# Patient Record
Sex: FEMALE | Race: WHITE | NOT HISPANIC OR LATINO | URBAN - METROPOLITAN AREA
[De-identification: names, ages, dates, MRNs, and addresses within clinical notes are randomized per-mention and may not be internally consistent; named-entity substitution may affect disease eponyms.]

---

## 2023-03-20 ENCOUNTER — TELEPHONE (OUTPATIENT)
Dept: PRIMARY CARE | Facility: CLINIC | Age: 53
End: 2023-03-20
Payer: COMMERCIAL

## 2023-03-20 NOTE — TELEPHONE ENCOUNTER
Please inform the patient that her Cologuard test was negative.  This must be repeated every 3 years.

## 2023-03-20 NOTE — TELEPHONE ENCOUNTER
Patient informed of results. She verbalized understanding and all questions and concerns answered at this time.

## 2023-03-31 ENCOUNTER — TELEPHONE (OUTPATIENT)
Dept: PRIMARY CARE | Facility: CLINIC | Age: 53
End: 2023-03-31
Payer: COMMERCIAL

## 2023-03-31 LAB
CHOLESTEROL (MG/DL) IN SER/PLAS: 210 MG/DL (ref 0–199)
CHOLESTEROL IN HDL (MG/DL) IN SER/PLAS: 57.5 MG/DL
CHOLESTEROL/HDL RATIO: 3.7
LDL: 129 MG/DL (ref 0–99)
THYROTROPIN (MIU/L) IN SER/PLAS BY DETECTION LIMIT <= 0.05 MIU/L: 3.56 MIU/L (ref 0.44–3.98)
TRIGLYCERIDE (MG/DL) IN SER/PLAS: 118 MG/DL (ref 0–149)
VLDL: 24 MG/DL (ref 0–40)

## 2023-03-31 NOTE — TELEPHONE ENCOUNTER
----- Message from Levon Green MD sent at 3/31/2023  1:17 PM EDT -----  Please inform the patient that her cholesterol is slightly elevated again at 210.  Her other labs are normal.  We will discuss at her next appt.

## 2023-04-07 DIAGNOSIS — E05.90 HYPERTHYROIDISM: ICD-10-CM

## 2023-04-07 RX ORDER — LEVOTHYROXINE SODIUM 88 UG/1
88 TABLET ORAL DAILY
Qty: 90 TABLET | Refills: 1 | Status: SHIPPED | OUTPATIENT
Start: 2023-04-07 | End: 2023-12-07 | Stop reason: SDUPTHER

## 2023-04-07 RX ORDER — LEVOTHYROXINE SODIUM 88 UG/1
88 TABLET ORAL DAILY
COMMUNITY
End: 2023-04-07 | Stop reason: SDUPTHER

## 2023-08-01 ENCOUNTER — OFFICE VISIT (OUTPATIENT)
Dept: PRIMARY CARE | Facility: CLINIC | Age: 53
End: 2023-08-01
Payer: COMMERCIAL

## 2023-08-01 VITALS
HEART RATE: 71 BPM | TEMPERATURE: 98 F | HEIGHT: 63 IN | WEIGHT: 141 LBS | RESPIRATION RATE: 16 BRPM | BODY MASS INDEX: 24.98 KG/M2 | DIASTOLIC BLOOD PRESSURE: 80 MMHG | SYSTOLIC BLOOD PRESSURE: 118 MMHG

## 2023-08-01 DIAGNOSIS — Z00.00 ENCOUNTER FOR PREVENTIVE HEALTH EXAMINATION: Primary | ICD-10-CM

## 2023-08-01 PROBLEM — K20.90 ACUTE ESOPHAGITIS: Status: ACTIVE | Noted: 2023-08-01

## 2023-08-01 PROBLEM — J30.2 SEASONAL ALLERGIC RHINITIS: Status: ACTIVE | Noted: 2023-08-01

## 2023-08-01 PROBLEM — E03.9 HYPOTHYROIDISM: Status: ACTIVE | Noted: 2023-08-01

## 2023-08-01 PROBLEM — E78.5 HLD (HYPERLIPIDEMIA): Status: ACTIVE | Noted: 2023-08-01

## 2023-08-01 PROBLEM — E55.9 VITAMIN D DEFICIENCY: Status: ACTIVE | Noted: 2023-08-01

## 2023-08-01 PROBLEM — S51.812S: Status: ACTIVE | Noted: 2023-08-01

## 2023-08-01 PROBLEM — J31.2 PHARYNGITIS, CHRONIC: Status: ACTIVE | Noted: 2023-08-01

## 2023-08-01 PROCEDURE — 1036F TOBACCO NON-USER: CPT | Performed by: FAMILY MEDICINE

## 2023-08-01 PROCEDURE — 99396 PREV VISIT EST AGE 40-64: CPT | Performed by: FAMILY MEDICINE

## 2023-08-01 NOTE — PROGRESS NOTES
Sylwia Moreno is a 53 y.o. female here today a periodic health exam.  I reviewed previous preventative health measures including screening tests, immunizations and labs.      HPI   CURRENT COMPLAINTS OR CONCERNS: None.  She is feeling well and has no health concerns or problems at this time.        PREVIOUS PREVENTATIVE HEALTH  Colonoscopy : NO  Cologuard : YES -- DATE 3/27/23  Mammogram : YES -- DATE 3/2023 - Dr. Carter  Pap Test :  YES -- DATE Yearly  PSA : N/A  Hepatitis C Antibody : NO  HIV Screening : NO  Prevnar : N/A  Tdap : YES -- DATE 8/20/21  Shingrix : YES -- DATE 2022 x 2  Yearly Flu Shot : YES    CURRENT FINDINGS  Healthy Diet : YES  Exercise :  YES --  regular  Depression Issues : NO  Alcohol Use : YES --  very rare  Tobacco Use : NO        Current Outpatient Medications:     levothyroxine (Synthroid, Levoxyl) 88 mcg tablet, Take 1 tablet (88 mcg) by mouth once daily. as directed, Disp: 90 tablet, Rfl: 1    Past Medical History:   Diagnosis Date    Acute upper respiratory infection, unspecified 12/19/2019    Acute upper respiratory infection    Otalgia, left ear 12/19/2019    Left ear pain       History reviewed. No pertinent surgical history.    No family history on file.    Social History     Tobacco Use    Smoking status: Never    Smokeless tobacco: Never   Vaping Use    Vaping Use: Never used   Substance Use Topics    Alcohol use: Yes    Drug use: Never       Immunization History   Administered Date(s) Administered    Pfizer Purple Cap SARS-CoV-2 02/24/2021, 03/17/2021, 10/24/2021    Tdap vaccine, age 10 years and older (BOOSTRIX) 05/20/2008, 08/20/2021    Zoster vaccine, recombinant, adult (SHINGRIX) 03/12/2022, 05/31/2022         Objective    Visit Vitals  /80   Pulse 71   Temp 36.7 °C (98 °F)   Resp 16       Physical Exam  Vitals and nursing note reviewed.   Constitutional:       General: She is not in acute distress.     Appearance: Normal appearance.   HENT:      Head: Normocephalic  and atraumatic.      Right Ear: Tympanic membrane, ear canal and external ear normal.      Left Ear: Tympanic membrane, ear canal and external ear normal.      Nose: Nose normal.      Mouth/Throat:      Mouth: Mucous membranes are moist.      Pharynx: Oropharynx is clear.   Eyes:      Extraocular Movements: Extraocular movements intact.      Conjunctiva/sclera: Conjunctivae normal.      Pupils: Pupils are equal, round, and reactive to light.   Cardiovascular:      Rate and Rhythm: Normal rate and regular rhythm.      Pulses: Normal pulses.      Heart sounds: Normal heart sounds. No murmur heard.     No friction rub. No gallop.   Pulmonary:      Effort: Pulmonary effort is normal. No respiratory distress.      Breath sounds: Normal breath sounds.   Abdominal:      General: Abdomen is flat. Bowel sounds are normal. There is no distension.      Palpations: Abdomen is soft.      Tenderness: There is no abdominal tenderness.   Musculoskeletal:         General: Normal range of motion.      Cervical back: Normal range of motion and neck supple.   Lymphadenopathy:      Cervical: No cervical adenopathy.   Skin:     General: Skin is warm and dry.      Findings: No lesion or rash.   Neurological:      General: No focal deficit present.      Mental Status: She is alert. Mental status is at baseline.   Psychiatric:         Mood and Affect: Mood normal.         Behavior: Behavior normal.         Thought Content: Thought content normal.         Judgment: Judgment normal.        Assessment    1. Encounter for preventive health examination  Hepatitis C Antibody, HIV 1/2 Antigen/Antibody Screen with Reflex to Confirmation   Recommend regular exercise, balanced diet, regular dental exams, and healthy habits.  Appropriate labs ordered or reviewed.  She will see her gynecologist for routine female care.  Colon cancer screening is up-to-date.  Immunizations are up-to-date.  I recommend to eat plenty of plant foods (such as whole-grain  products, fruits, and vegetables) and a moderate amount of lean and low-fat, animal-based food (meat and dairy products).  When shopping, choose lean meats, fish, and poultry. I recommend to increase aerobic exercise.  I recommend a yearly flu shot in the fall and I recommend a yearly wellness exam.

## 2023-12-07 ENCOUNTER — TELEPHONE (OUTPATIENT)
Dept: PRIMARY CARE | Facility: CLINIC | Age: 53
End: 2023-12-07
Payer: COMMERCIAL

## 2023-12-07 DIAGNOSIS — E05.90 HYPERTHYROIDISM: ICD-10-CM

## 2023-12-07 NOTE — TELEPHONE ENCOUNTER
1) Pt req refill of Synthroid. She has 1 pill left. Send to local pharm: correct in computer. Next office visit 2/01/24.  2) Does pt need to do lab work? It looks like there is an order for labs in computer. What is being tested? Does pt need lab work to check thyroid?

## 2023-12-08 DIAGNOSIS — E03.9 ACQUIRED HYPOTHYROIDISM: Primary | ICD-10-CM

## 2023-12-08 RX ORDER — LEVOTHYROXINE SODIUM 88 UG/1
88 TABLET ORAL DAILY
Qty: 90 TABLET | Refills: 0 | Status: SHIPPED | OUTPATIENT
Start: 2023-12-08 | End: 2024-02-01 | Stop reason: SDUPTHER

## 2024-02-01 ENCOUNTER — OFFICE VISIT (OUTPATIENT)
Dept: PRIMARY CARE | Facility: CLINIC | Age: 54
End: 2024-02-01
Payer: COMMERCIAL

## 2024-02-01 VITALS
WEIGHT: 142 LBS | RESPIRATION RATE: 16 BRPM | HEART RATE: 71 BPM | DIASTOLIC BLOOD PRESSURE: 70 MMHG | TEMPERATURE: 98.1 F | HEIGHT: 63 IN | SYSTOLIC BLOOD PRESSURE: 110 MMHG | BODY MASS INDEX: 25.16 KG/M2

## 2024-02-01 DIAGNOSIS — E55.9 VITAMIN D DEFICIENCY: ICD-10-CM

## 2024-02-01 DIAGNOSIS — E78.2 MIXED HYPERLIPIDEMIA: ICD-10-CM

## 2024-02-01 DIAGNOSIS — E03.9 ACQUIRED HYPOTHYROIDISM: Primary | ICD-10-CM

## 2024-02-01 PROBLEM — N81.4 UTERINE PROLAPSE: Status: RESOLVED | Noted: 2017-08-08 | Resolved: 2024-02-01

## 2024-02-01 PROBLEM — J31.2 PHARYNGITIS, CHRONIC: Status: RESOLVED | Noted: 2023-08-01 | Resolved: 2024-02-01

## 2024-02-01 PROBLEM — S51.812S: Status: RESOLVED | Noted: 2023-08-01 | Resolved: 2024-02-01

## 2024-02-01 PROBLEM — N81.4 UTERINE PROLAPSE: Status: ACTIVE | Noted: 2017-08-08

## 2024-02-01 PROBLEM — N95.0 PMB (POSTMENOPAUSAL BLEEDING): Status: ACTIVE | Noted: 2024-01-31

## 2024-02-01 PROBLEM — L72.0 INCLUSION CYST: Status: ACTIVE | Noted: 2018-08-03

## 2024-02-01 PROCEDURE — 1036F TOBACCO NON-USER: CPT | Performed by: FAMILY MEDICINE

## 2024-02-01 PROCEDURE — 99214 OFFICE O/P EST MOD 30 MIN: CPT | Performed by: FAMILY MEDICINE

## 2024-02-01 RX ORDER — LEVOTHYROXINE SODIUM 88 UG/1
88 TABLET ORAL DAILY
Qty: 90 TABLET | Refills: 1 | Status: SHIPPED | OUTPATIENT
Start: 2024-02-01

## 2024-02-01 NOTE — PROGRESS NOTES
"Sylwia Moreno is a 53 y.o. female here today for  thyroid recheck.       HPI   Hypothyroidism -- patient reports no problems with dry skin, constipation, cold intolerance, unexplained weight loss, fatigue or heart palpitations. Patient is taking medications correctly with no side effects or problems.      She has a history of vitamin D deficiency but she is not currently taking vitamin D.    She has a history of high cholesterol but she has never been on a cholesterol medicine.  She does eat a healthy diet and exercises very regularly.      Current Outpatient Medications:     levothyroxine (Synthroid, Levoxyl) 88 mcg tablet, Take 1 tablet (88 mcg) by mouth once daily. as directed, Disp: 90 tablet, Rfl: 1    Patient Active Problem List   Diagnosis    Acute esophagitis    Mixed hyperlipidemia    Acquired hypothyroidism    Seasonal allergic rhinitis    Vitamin D deficiency    Encounter for preventive health examination    Inclusion cyst    PMB (postmenopausal bleeding)         No results found for this or any previous visit (from the past 672 hour(s)).     Objective    Visit Vitals    Visit Vitals  /70   Pulse 71   Temp 36.7 °C (98.1 °F)   Resp 16   Ht 1.6 m (5' 3\")   Wt 64.4 kg (142 lb)   BMI 25.15 kg/m²   Smoking Status Never   BSA 1.69 m²       Body mass index is 25.15 kg/m².     Physical Exam   General - Not in acute distress and cooperative.  Build & Nutrition - Well developed  Posture - Normal  Gait - Normal  Mental Status - alert and oriented x 3    Head - Normocephalic    Neck - Thyroid normal size    Eyes - Bilateral - Sclera clear and lids pink without edema or mass.      Skin - Warm and dry with no rashes on visible skin    Lungs - Clear to auscultation and normal breathing effort    Cardiovascular - RRR and no murmurs, rubs or thrill.    Peripheral Vascular - Bilateral - no edema present    Neuropsychiatric - normal mood and affect        Assessment    1. Acquired hypothyroidism  levothyroxine " (Synthroid, Levoxyl) 88 mcg tablet, Thyroid Stimulating Hormone   Condition well controlled.  No change in current treatment regimen.  Refill given of current medication.  Appropriate labs ordered or reviewed.  Make a follow up appointment with me for recheck in 6 months.       2. Mixed hyperlipidemia  Lipid Panel   Appropriate labs ordered or reviewed.     3. Vitamin D deficiency  Vitamin D 25-Hydroxy,Total (for eval of Vitamin D levels)   Appropriate labs ordered or reviewed.

## 2024-07-16 ENCOUNTER — LAB (OUTPATIENT)
Dept: LAB | Facility: LAB | Age: 54
End: 2024-07-16
Payer: COMMERCIAL

## 2024-07-16 DIAGNOSIS — E03.9 ACQUIRED HYPOTHYROIDISM: ICD-10-CM

## 2024-07-16 DIAGNOSIS — E78.2 MIXED HYPERLIPIDEMIA: ICD-10-CM

## 2024-07-16 DIAGNOSIS — E55.9 VITAMIN D DEFICIENCY: ICD-10-CM

## 2024-07-16 DIAGNOSIS — Z00.00 ENCOUNTER FOR PREVENTIVE HEALTH EXAMINATION: ICD-10-CM

## 2024-07-16 LAB
25(OH)D3 SERPL-MCNC: 34 NG/ML (ref 30–100)
CHOLEST SERPL-MCNC: 214 MG/DL (ref 0–199)
CHOLESTEROL/HDL RATIO: 4.1
HCV AB SER QL: NONREACTIVE
HDLC SERPL-MCNC: 52.4 MG/DL
HIV 1+2 AB+HIV1 P24 AG SERPL QL IA: NONREACTIVE
LDLC SERPL CALC-MCNC: 139 MG/DL
NON HDL CHOLESTEROL: 162 MG/DL (ref 0–149)
T4 FREE SERPL-MCNC: 1.4 NG/DL (ref 0.78–1.48)
TRIGL SERPL-MCNC: 115 MG/DL (ref 0–149)
TSH SERPL-ACNC: 0.35 MIU/L (ref 0.44–3.98)
VLDL: 23 MG/DL (ref 0–40)

## 2024-07-16 PROCEDURE — 84439 ASSAY OF FREE THYROXINE: CPT

## 2024-07-16 PROCEDURE — 82306 VITAMIN D 25 HYDROXY: CPT

## 2024-07-16 PROCEDURE — 87389 HIV-1 AG W/HIV-1&-2 AB AG IA: CPT

## 2024-07-16 PROCEDURE — 86803 HEPATITIS C AB TEST: CPT

## 2024-07-16 PROCEDURE — 84443 ASSAY THYROID STIM HORMONE: CPT

## 2024-07-16 PROCEDURE — 36415 COLL VENOUS BLD VENIPUNCTURE: CPT

## 2024-07-16 PROCEDURE — 80061 LIPID PANEL: CPT

## 2024-07-16 RX ORDER — LEVOTHYROXINE SODIUM 75 UG/1
75 TABLET ORAL DAILY
Qty: 30 TABLET | Refills: 3 | Status: SHIPPED | OUTPATIENT
Start: 2024-07-16 | End: 2025-07-16

## 2024-07-16 NOTE — RESULT ENCOUNTER NOTE
Please inform the patient that her recent labs show that her thyroid level is slightly too high.  We need to decrease the dose of levothyroxine.  She is currently taking 88 mcg and I want to decrease to 75 mcg once a day.  Please send in a 3-month prescription and we need to check her TSH in 2 months to make sure that this is the correct dose.  Her cholesterol labs were also slightly elevated as we have discussed in the past but her risk is not high enough to recommend medications at this time.  The rest of her labs were essentially normal.

## 2024-08-01 ENCOUNTER — APPOINTMENT (OUTPATIENT)
Dept: PRIMARY CARE | Facility: CLINIC | Age: 54
End: 2024-08-01
Payer: COMMERCIAL

## 2024-08-01 VITALS
WEIGHT: 142 LBS | TEMPERATURE: 97.9 F | SYSTOLIC BLOOD PRESSURE: 114 MMHG | HEART RATE: 65 BPM | HEIGHT: 63 IN | BODY MASS INDEX: 25.16 KG/M2 | RESPIRATION RATE: 16 BRPM | DIASTOLIC BLOOD PRESSURE: 70 MMHG

## 2024-08-01 DIAGNOSIS — E78.2 MIXED HYPERLIPIDEMIA: Primary | ICD-10-CM

## 2024-08-01 DIAGNOSIS — E03.9 ACQUIRED HYPOTHYROIDISM: ICD-10-CM

## 2024-08-01 PROCEDURE — 1036F TOBACCO NON-USER: CPT | Performed by: FAMILY MEDICINE

## 2024-08-01 PROCEDURE — 3008F BODY MASS INDEX DOCD: CPT | Performed by: FAMILY MEDICINE

## 2024-08-01 PROCEDURE — 99213 OFFICE O/P EST LOW 20 MIN: CPT | Performed by: FAMILY MEDICINE

## 2024-08-01 RX ORDER — LEVOTHYROXINE SODIUM 75 UG/1
75 TABLET ORAL DAILY
Qty: 90 TABLET | Refills: 1 | Status: SHIPPED | OUTPATIENT
Start: 2024-08-01 | End: 2025-08-01

## 2024-08-01 NOTE — PROGRESS NOTES
Sylwia Moreno is a 54 y.o. female here today for   Chief Complaint   Patient presents with    Hypothyroidism        HPI   Hypothyroidism -- patient reports no problems with dry skin, constipation, cold intolerance, unexplained weight loss, fatigue or heart palpitations. Patient is taking medications correctly with no side effects or problems.  Recent labs showed that her thyroid was slightly elevated so we decreased levothyroxine from 88 mcg down to 75 mcg.    HLD recheck --she has never been on medication for this.  Her 10-year ASCVD risk today is 1.7%.  Recent labs show that her total cholesterol was still slightly elevated at 214.        Current Outpatient Medications:     levothyroxine (Synthroid, Levoxyl) 75 mcg tablet, Take 1 tablet (75 mcg) by mouth early in the morning.. Take on an empty stomach at the same time each day, either 30 to 60 minutes prior to breakfast, Disp: 90 tablet, Rfl: 1    Patient Active Problem List   Diagnosis    Acute esophagitis    Mixed hyperlipidemia    Acquired hypothyroidism    Seasonal allergic rhinitis    Vitamin D deficiency    Encounter for preventive health examination    Inclusion cyst    PMB (postmenopausal bleeding)         Recent Results (from the past 672 hour(s))   Hepatitis C Antibody    Collection Time: 07/16/24  7:27 AM   Result Value Ref Range    Hepatitis C AB Nonreactive Nonreactive   HIV 1/2 Antigen/Antibody Screen with Reflex to Confirmation    Collection Time: 07/16/24  7:27 AM   Result Value Ref Range    HIV 1/2 Antigen/Antibody Screen with Reflex to Confirmation Nonreactive Nonreactive   TSH with reflex to Free T4 if abnormal    Collection Time: 07/16/24  7:27 AM   Result Value Ref Range    Thyroid Stimulating Hormone 0.35 (L) 0.44 - 3.98 mIU/L   Lipid Panel    Collection Time: 07/16/24  7:27 AM   Result Value Ref Range    Cholesterol 214 (H) 0 - 199 mg/dL    HDL-Cholesterol 52.4 mg/dL    Cholesterol/HDL Ratio 4.1     LDL Calculated 139 (H) <=99 mg/dL     "VLDL 23 0 - 40 mg/dL    Triglycerides 115 0 - 149 mg/dL    Non HDL Cholesterol 162 (H) 0 - 149 mg/dL   Vitamin D 25-Hydroxy,Total (for eval of Vitamin D levels)    Collection Time: 07/16/24  7:27 AM   Result Value Ref Range    Vitamin D, 25-Hydroxy, Total 34 30 - 100 ng/mL   Thyroxine, Free    Collection Time: 07/16/24  7:27 AM   Result Value Ref Range    Thyroxine, Free 1.40 0.78 - 1.48 ng/dL        Objective    Visit Vitals    Visit Vitals  /70   Pulse 65   Temp 36.6 °C (97.9 °F)   Resp 16   Ht 1.6 m (5' 3\")   Wt 64.4 kg (142 lb)   BMI 25.15 kg/m²   Smoking Status Never   BSA 1.69 m²       Body mass index is 25.15 kg/m².     Physical Exam   General - Not in acute distress and cooperative.  Build & Nutrition - Well developed  Posture - Normal  Gait - Normal  Mental Status - alert and oriented x 3    Head - Normocephalic    Neck - Thyroid normal size    Eyes - Bilateral - Sclera clear and lids pink without edema or mass.      Skin - Warm and dry with no rashes on visible skin    Lungs - Clear to auscultation and normal breathing effort    Cardiovascular - RRR and no murmurs, rubs or thrill.    Peripheral Vascular - Bilateral - no edema present    Neuropsychiatric - normal mood and affect        Assessment    1. Mixed hyperlipidemia     She has never been on medication for this and her 10-year risk is low so medications are not indicated at this point.  She already eats a healthy diet and exercises regularly.  We discussed ways to lower her cholesterol further with dietary changes.  We will monitor over time.     2. Acquired hypothyroidism  levothyroxine (Synthroid, Levoxyl) 75 mcg tablet   We recently decreased her levothyroxine dose to 75 mcg and we will get a repeat TSH in about 2 to 3 months and call her with results.         Orders Placed This Encounter      levothyroxine (Synthroid, Levoxyl) 75 mcg tablet       No orders of the defined types were placed in this encounter.       New Medications Ordered " This Visit   Medications    levothyroxine (Synthroid, Levoxyl) 75 mcg tablet     Sig: Take 1 tablet (75 mcg) by mouth early in the morning.. Take on an empty stomach at the same time each day, either 30 to 60 minutes prior to breakfast     Dispense:  90 tablet     Refill:  1

## 2024-08-02 ENCOUNTER — OFFICE VISIT (OUTPATIENT)
Dept: PODIATRY | Facility: CLINIC | Age: 54
End: 2024-08-02
Payer: COMMERCIAL

## 2024-08-02 DIAGNOSIS — M72.2 PLANTAR FASCIITIS: Primary | ICD-10-CM

## 2024-08-02 PROCEDURE — 99203 OFFICE O/P NEW LOW 30 MIN: CPT | Performed by: PODIATRIST

## 2024-08-02 RX ORDER — MELOXICAM 15 MG/1
15 TABLET ORAL DAILY
Qty: 30 TABLET | Refills: 11 | Status: SHIPPED | OUTPATIENT
Start: 2024-08-02 | End: 2025-08-02

## 2024-08-02 NOTE — PROGRESS NOTES
History Of Present Illness  Sylwia Moreno is a 54 y.o. female presenting with chief complaint of: transition of care for right foot pain   Patient has intense pain for steps in the morning and after periods of rest.  The pain is primarily in the plantar lateral right heel.  It can radiate up her leg and down the lateral aspect of her foot.  PCP Levon Green  Last visit 8/1/24     Past Medical History  She has a past medical history of Acute upper respiratory infection, unspecified (12/19/2019), Forearm laceration, left, sequela (08/01/2023), Otalgia, left ear (12/19/2019), Pharyngitis, chronic (08/01/2023), and Uterine prolapse (08/08/2017).    Surgical History  She has no past surgical history on file.     Social History  She reports that she has never smoked. She has never used smokeless tobacco. She reports current alcohol use. She reports that she does not use drugs.    Family History  No family history on file.     Allergies  Patient has no known allergies.    Medications  Current Outpatient Medications   Medication Sig Dispense Refill    levothyroxine (Synthroid, Levoxyl) 75 mcg tablet Take 1 tablet (75 mcg) by mouth early in the morning.. Take on an empty stomach at the same time each day, either 30 to 60 minutes prior to breakfast 90 tablet 1     No current facility-administered medications for this visit.       Review of Systems    REVIEW OF SYSTEMS  GENERAL:  Negative for malaise, significant weight loss, fever  CARDIOVASCULAR: leg swelling   MUSCULOSKELETAL:  Negative for joint pain or swelling, back pain, and muscle pain.  SKIN:  Negative for lesions, rash, and itching  PSYCH:  Negative for sleep disturbance, mood disorder and recent psychosocial stressors  NEURO: Negative, denies any burning, tingling or numbness     Objective:   Vasc: DP and PT pulses are palpable bilateral.  CFT is less than 3 seconds bilateral.  Skin temperature is warm to cool proximal to distal bilateral.      Neuro:  Light  touch is intact to the foot bilateral. There is no clonus noted.  The hallux is downgoing bilateral.      Derm: Nails are normal. Skin is supple with normal texture and turgor noted.  Webspaces are clean, dry and intact bilateral.  There are no hyperkeratoses, ulcerations, verruca or other lesions noted.      Ortho: Muscle strength is 5/5 for all pedal groups tested.  Ankle joint, subtalar joint, 1st MPJ and lesser MPJ ROM is full and without pain or crepitus.  The foot type is rectus bilateral off weight bearing.  Mild pes planus.  Pain is present with palpation of the insertion site of the lateral band of the plantar fascia.  Assessment/Plan     Diagnoses and all orders for this visit:  Plantar fasciitis      We discussed etiology of plantar fasciitis as well as treatments.  Patient will try meloxicam daily for the next 20 to 30 days.  She gear modification has been advised.  Patient can also try night splint.  Patient is given a prescription for physical therapy.           Swapna Mederos CMA

## 2025-02-04 ENCOUNTER — APPOINTMENT (OUTPATIENT)
Dept: PRIMARY CARE | Facility: CLINIC | Age: 55
End: 2025-02-04
Payer: COMMERCIAL

## 2025-03-07 DIAGNOSIS — E03.9 ACQUIRED HYPOTHYROIDISM: ICD-10-CM

## 2025-03-07 RX ORDER — LEVOTHYROXINE SODIUM 75 UG/1
75 TABLET ORAL DAILY
Qty: 30 TABLET | Refills: 0 | Status: SHIPPED | OUTPATIENT
Start: 2025-03-07 | End: 2026-03-07

## 2025-03-17 ENCOUNTER — APPOINTMENT (OUTPATIENT)
Dept: PRIMARY CARE | Facility: CLINIC | Age: 55
End: 2025-03-17
Payer: COMMERCIAL

## 2025-03-17 VITALS
HEART RATE: 70 BPM | SYSTOLIC BLOOD PRESSURE: 124 MMHG | WEIGHT: 141 LBS | BODY MASS INDEX: 24.98 KG/M2 | HEIGHT: 63 IN | DIASTOLIC BLOOD PRESSURE: 72 MMHG | RESPIRATION RATE: 16 BRPM | TEMPERATURE: 97.7 F

## 2025-03-17 DIAGNOSIS — E55.9 VITAMIN D DEFICIENCY: ICD-10-CM

## 2025-03-17 DIAGNOSIS — E03.9 ACQUIRED HYPOTHYROIDISM: Primary | ICD-10-CM

## 2025-03-17 DIAGNOSIS — E78.2 MIXED HYPERLIPIDEMIA: ICD-10-CM

## 2025-03-17 PROCEDURE — 3008F BODY MASS INDEX DOCD: CPT | Performed by: FAMILY MEDICINE

## 2025-03-17 PROCEDURE — 1036F TOBACCO NON-USER: CPT | Performed by: FAMILY MEDICINE

## 2025-03-17 PROCEDURE — 99213 OFFICE O/P EST LOW 20 MIN: CPT | Performed by: FAMILY MEDICINE

## 2025-03-17 RX ORDER — LEVOTHYROXINE SODIUM 75 UG/1
75 TABLET ORAL DAILY
Qty: 90 TABLET | Refills: 1 | Status: SHIPPED | OUTPATIENT
Start: 2025-03-17

## 2025-03-17 NOTE — ASSESSMENT & PLAN NOTE
She is not currently taking medication for this and we reviewed dietary changes and exercise.  Her risk is 2.1% today so medications are not indicated.  We will monitor over time.

## 2025-03-17 NOTE — PROGRESS NOTES
"Sylwia Moreno is a 54 y.o. female here today for   Chief Complaint   Patient presents with    Hypothyroidism    Hyperlipidemia        HPI     Hypothyroidism -- patient reports no problems with dry skin, constipation, cold intolerance, unexplained weight loss, fatigue or heart palpitations. Patient is taking medications correctly with no side effects or problems.  We decreased to 75 mcg about 8 months ago and overdue for lab.      Vitamin D deficiency -- Patient reports no muscle weakness or pain.  Taking Vitamin D as instructed.  OTC Vit D 2000 U daily.          Current Outpatient Medications:     meloxicam (Mobic) 15 mg tablet, Take 1 tablet (15 mg) by mouth once daily., Disp: 30 tablet, Rfl: 11    levothyroxine (Synthroid, Levoxyl) 75 mcg tablet, Take 1 tablet (75 mcg) by mouth early in the morning.. Take on an empty stomach at the same time each day, either 30 to 60 minutes prior to breakfast, Disp: 90 tablet, Rfl: 1    Patient Active Problem List   Diagnosis    Acute esophagitis    Mixed hyperlipidemia    Acquired hypothyroidism    Seasonal allergic rhinitis    Vitamin D deficiency    Encounter for preventive health examination    Inclusion cyst    PMB (postmenopausal bleeding)         Objective    Visit Vitals    Visit Vitals  /72   Pulse 70   Temp 36.5 °C (97.7 °F)   Resp 16   Ht 1.6 m (5' 3\")   Wt 64 kg (141 lb)   BMI 24.98 kg/m²   Smoking Status Never   BSA 1.69 m²       Body mass index is 24.98 kg/m².     Physical Exam     General - Not in acute distress and cooperative.  Build & Nutrition - Well developed  Posture - Normal  Gait - Normal  Mental Status - alert and oriented x 3    Head - Normocephalic    Neck - Thyroid normal size    Eyes - Bilateral - Sclera clear and lids pink without edema or mass.      Skin - Warm and dry with no rashes on visible skin    Lungs - Clear to auscultation and normal breathing effort    Cardiovascular - RRR and no murmurs, rubs or thrill.    Peripheral Vascular - " Bilateral - no edema present    Neuropsychiatric - normal mood and affect        Assessment & Plan  Acquired hypothyroidism  Condition well controlled.  No change in current treatment regimen.  Refill given of current medication.  Appropriate labs ordered or reviewed.  Make a follow up appointment with me for recheck in 6 months.  Orders:    TSH with reflex to Free T4 if abnormal; Future    levothyroxine (Synthroid, Levoxyl) 75 mcg tablet; Take 1 tablet (75 mcg) by mouth early in the morning.. Take on an empty stomach at the same time each day, either 30 to 60 minutes prior to breakfast    Mixed hyperlipidemia  She is not currently taking medication for this and we reviewed dietary changes and exercise.  Her risk is 2.1% today so medications are not indicated.  We will monitor over time.       Vitamin D deficiency  She will continue to take over-the-counter vitamin D 2000 units daily.            Orders Placed This Encounter      levothyroxine (Synthroid, Levoxyl) 75 mcg tablet       Orders Placed This Encounter   Procedures    TSH with reflex to Free T4 if abnormal        New Medications Ordered This Visit   Medications    levothyroxine (Synthroid, Levoxyl) 75 mcg tablet     Sig: Take 1 tablet (75 mcg) by mouth early in the morning.. Take on an empty stomach at the same time each day, either 30 to 60 minutes prior to breakfast     Dispense:  90 tablet     Refill:  1

## 2025-03-17 NOTE — ASSESSMENT & PLAN NOTE
Condition well controlled.  No change in current treatment regimen.  Refill given of current medication.  Appropriate labs ordered or reviewed.  Make a follow up appointment with me for recheck in 6 months.  Orders:    TSH with reflex to Free T4 if abnormal; Future    levothyroxine (Synthroid, Levoxyl) 75 mcg tablet; Take 1 tablet (75 mcg) by mouth early in the morning.. Take on an empty stomach at the same time each day, either 30 to 60 minutes prior to breakfast

## 2025-03-27 LAB — TSH SERPL-ACNC: 1.31 MIU/L

## 2025-05-15 DIAGNOSIS — E03.9 ACQUIRED HYPOTHYROIDISM: ICD-10-CM

## 2025-05-15 RX ORDER — LEVOTHYROXINE SODIUM 75 UG/1
75 TABLET ORAL DAILY
Qty: 90 TABLET | Refills: 0 | Status: SHIPPED | OUTPATIENT
Start: 2025-05-15

## 2025-07-23 DIAGNOSIS — Z12.31 ENCOUNTER FOR SCREENING MAMMOGRAM FOR BREAST CANCER: ICD-10-CM

## 2025-09-02 DIAGNOSIS — E03.9 ACQUIRED HYPOTHYROIDISM: ICD-10-CM

## 2025-09-02 RX ORDER — LEVOTHYROXINE SODIUM 75 UG/1
75 TABLET ORAL DAILY
Qty: 30 TABLET | Refills: 0 | Status: SHIPPED | OUTPATIENT
Start: 2025-09-02

## 2025-09-10 ENCOUNTER — APPOINTMENT (OUTPATIENT)
Dept: RADIOLOGY | Facility: CLINIC | Age: 55
End: 2025-09-10
Payer: COMMERCIAL

## 2025-09-18 ENCOUNTER — APPOINTMENT (OUTPATIENT)
Dept: PRIMARY CARE | Facility: CLINIC | Age: 55
End: 2025-09-18
Payer: COMMERCIAL